# Patient Record
Sex: MALE | Race: BLACK OR AFRICAN AMERICAN | Employment: UNEMPLOYED
[De-identification: names, ages, dates, MRNs, and addresses within clinical notes are randomized per-mention and may not be internally consistent; named-entity substitution may affect disease eponyms.]

---
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
## 2021-07-13 ENCOUNTER — HOSPITAL ENCOUNTER (EMERGENCY)
Age: 21
Discharge: HOME | End: 2021-07-13
Attending: NURSE PRACTITIONER
Payer: MEDICARE

## 2021-07-13 VITALS
WEIGHT: 162 LBS | SYSTOLIC BLOOD PRESSURE: 130 MMHG | HEART RATE: 68 BPM | TEMPERATURE: 98.2 F | BODY MASS INDEX: 26.96 KG/M2 | DIASTOLIC BLOOD PRESSURE: 69 MMHG | RESPIRATION RATE: 14 BRPM | OXYGEN SATURATION: 97 %

## 2021-07-13 DIAGNOSIS — Z20.2 EXPOSURE TO STD: Primary | ICD-10-CM

## 2021-07-13 LAB
BILIRUBIN URINE: NEGATIVE
BLOOD, URINE: NEGATIVE
CHARACTER, URINE: CLEAR
COLOR: YELLOW
GLUCOSE URINE: NEGATIVE MG/DL
KETONES, URINE: NEGATIVE
LEUKOCYTE ESTERASE, URINE: NEGATIVE
NITRITE, URINE: NEGATIVE
PH, URINE: 6 (ref 5–9)
PROTEIN, URINE: NEGATIVE MG/DL
SPECIFIC GRAVITY, URINE: >= 1.03 (ref 1–1.03)
UROBILINOGEN, URINE: 0.2 EU/DL (ref 0.2–1)

## 2021-07-13 PROCEDURE — 99213 OFFICE O/P EST LOW 20 MIN: CPT

## 2021-07-13 PROCEDURE — 6370000000 HC RX 637 (ALT 250 FOR IP): Performed by: NURSE PRACTITIONER

## 2021-07-13 PROCEDURE — 81003 URINALYSIS AUTO W/O SCOPE: CPT

## 2021-07-13 PROCEDURE — 2500000003 HC RX 250 WO HCPCS: Performed by: NURSE PRACTITIONER

## 2021-07-13 PROCEDURE — 99213 OFFICE O/P EST LOW 20 MIN: CPT | Performed by: NURSE PRACTITIONER

## 2021-07-13 PROCEDURE — 87491 CHLMYD TRACH DNA AMP PROBE: CPT

## 2021-07-13 PROCEDURE — 96372 THER/PROPH/DIAG INJ SC/IM: CPT

## 2021-07-13 PROCEDURE — 87591 N.GONORRHOEAE DNA AMP PROB: CPT

## 2021-07-13 PROCEDURE — 6360000002 HC RX W HCPCS: Performed by: NURSE PRACTITIONER

## 2021-07-13 RX ORDER — AZITHROMYCIN 250 MG/1
1000 TABLET, FILM COATED ORAL ONCE
Status: COMPLETED | OUTPATIENT
Start: 2021-07-13 | End: 2021-07-13

## 2021-07-13 RX ADMIN — LIDOCAINE HYDROCHLORIDE 500 MG: 10 INJECTION, SOLUTION EPIDURAL; INFILTRATION; INTRACAUDAL; PERINEURAL at 08:39

## 2021-07-13 RX ADMIN — AZITHROMYCIN MONOHYDRATE 1000 MG: 250 TABLET ORAL at 08:38

## 2021-07-13 ASSESSMENT — ENCOUNTER SYMPTOMS
COUGH: 0
SORE THROAT: 0
CHEST TIGHTNESS: 0
DIARRHEA: 0
SHORTNESS OF BREATH: 0
RHINORRHEA: 0
VOMITING: 0
NAUSEA: 0

## 2021-07-13 NOTE — ED TRIAGE NOTES
Pt ambulatory into esuc with c/o std, pt states for the past week he has had a tingling sensation after urination and just feels dirty. Pt denies any pain, discharge or sores.

## 2021-07-17 LAB
CHLAMYDIA TRACHOMATIS AMPLIFIED DET: NEGATIVE
NEISSERIA GONORRHOEAE BY AMP: NEGATIVE
SPECIMEN SOURCE: NORMAL

## 2022-11-21 ENCOUNTER — HOSPITAL ENCOUNTER (EMERGENCY)
Age: 22
Discharge: HOME | End: 2022-11-21
Payer: MEDICARE

## 2022-11-21 VITALS
DIASTOLIC BLOOD PRESSURE: 84 MMHG | TEMPERATURE: 98.5 F | WEIGHT: 170 LBS | SYSTOLIC BLOOD PRESSURE: 127 MMHG | OXYGEN SATURATION: 98 % | BODY MASS INDEX: 28.29 KG/M2 | HEART RATE: 82 BPM | RESPIRATION RATE: 14 BRPM

## 2022-11-21 DIAGNOSIS — Z20.2 GONORRHEA CONTACT: Primary | ICD-10-CM

## 2022-11-21 LAB
CHLAMYDIA TRACHOMATIS BY RT-PCR: DETECTED
CT/NG SOURCE: ABNORMAL
NEISSERIA GONORRHOEAE BY RT-PCR: NOT DETECTED

## 2022-11-21 PROCEDURE — 6360000002 HC RX W HCPCS: Performed by: NURSE PRACTITIONER

## 2022-11-21 PROCEDURE — 87591 N.GONORRHOEAE DNA AMP PROB: CPT

## 2022-11-21 PROCEDURE — 6370000000 HC RX 637 (ALT 250 FOR IP): Performed by: NURSE PRACTITIONER

## 2022-11-21 PROCEDURE — 87491 CHLMYD TRACH DNA AMP PROBE: CPT

## 2022-11-21 PROCEDURE — 99213 OFFICE O/P EST LOW 20 MIN: CPT | Performed by: NURSE PRACTITIONER

## 2022-11-21 PROCEDURE — 2500000003 HC RX 250 WO HCPCS: Performed by: NURSE PRACTITIONER

## 2022-11-21 PROCEDURE — 96372 THER/PROPH/DIAG INJ SC/IM: CPT

## 2022-11-21 PROCEDURE — 99213 OFFICE O/P EST LOW 20 MIN: CPT

## 2022-11-21 RX ORDER — AZITHROMYCIN 250 MG/1
1000 TABLET, FILM COATED ORAL ONCE
Status: COMPLETED | OUTPATIENT
Start: 2022-11-21 | End: 2022-11-21

## 2022-11-21 RX ADMIN — AZITHROMYCIN MONOHYDRATE 1000 MG: 250 TABLET ORAL at 12:49

## 2022-11-21 RX ADMIN — LIDOCAINE HYDROCHLORIDE 500 MG: 10 INJECTION, SOLUTION EPIDURAL; INFILTRATION; INTRACAUDAL; PERINEURAL at 12:49

## 2022-11-21 ASSESSMENT — ENCOUNTER SYMPTOMS
NAUSEA: 0
SHORTNESS OF BREATH: 0
VOMITING: 0

## 2022-11-21 ASSESSMENT — PAIN - FUNCTIONAL ASSESSMENT: PAIN_FUNCTIONAL_ASSESSMENT: NONE - DENIES PAIN

## 2022-11-21 NOTE — ED TRIAGE NOTES
Jemma Florian arrives to room with complaint of std exposure states he was exposed yesterday. Partner informed pt yesterday she has gonorrhea. Pt has no symptoms.

## 2022-11-21 NOTE — ED PROVIDER NOTES
[facility]  Urgent Care Encounter       CHIEF COMPLAINT       Chief Complaint   Patient presents with    Exposure to STD       Nurses Notes reviewed and I agree except as noted in the HPI. HISTORY OF PRESENT ILLNESS   Junior Gaitan is a 25 y.o. male who presents for concerns of exposure to gonorrhea. Patient states that he has been sexually active with a female partner over the past 2 weeks who recently told him that she tested positive for gonorrhea. He denies any symptoms at this time. He states that he has had 5 sexual partners over the past 2 to 3 months but denies any partners since the female partner who exposed him to gonorrhea. States that he would like to be tested and treated today. The history is provided by the patient. REVIEW OF SYSTEMS     Review of Systems   Constitutional:  Negative for chills and fever. Respiratory:  Negative for shortness of breath. Cardiovascular:  Negative for chest pain. Gastrointestinal:  Negative for nausea and vomiting. Genitourinary:  Negative for dysuria, penile discharge, scrotal swelling and testicular pain. Musculoskeletal:  Negative for arthralgias and myalgias. Skin:  Negative for rash. Neurological:  Negative for headaches. PAST MEDICAL HISTORY         Diagnosis Date    ADHD (attention deficit hyperactivity disorder)        SURGICALHISTORY     Patient  has a past surgical history that includes Mandible fracture surgery (09/14/2017); Mandible fracture surgery (N/A, 9/14/2017); Hardware Removal (11/02/2017); and pr removal superficial implant (N/A, 11/2/2017). CURRENT MEDICATIONS       Previous Medications    No medications on file       ALLERGIES     Patient is has No Known Allergies. Patients   There is no immunization history on file for this patient. FAMILY HISTORY     Patient's family history is not on file. SOCIAL HISTORY     Patient  reports that he has been smoking cigarettes.  He has a 5.00 pack-year smoking history. He has never used smokeless tobacco. He reports current alcohol use. He reports current drug use. Drug: Marijuana Ehsan Rothman). PHYSICAL EXAM     ED TRIAGE VITALS  BP: 127/84, Temp: 98.5 °F (36.9 °C), Heart Rate: 82, Resp: 14, SpO2: 98 %,Estimated body mass index is 28.29 kg/m² as calculated from the following:    Height as of 11/11/19: 5' 5\" (1.651 m). Weight as of this encounter: 170 lb (77.1 kg). ,No LMP for male patient. Physical Exam  Vitals and nursing note reviewed. Constitutional:       General: He is not in acute distress. Appearance: He is well-developed. He is not diaphoretic. Eyes:      Conjunctiva/sclera:      Right eye: Right conjunctiva is not injected. Left eye: Left conjunctiva is not injected. Pupils: Pupils are equal.   Cardiovascular:      Rate and Rhythm: Normal rate and regular rhythm. Heart sounds: No murmur heard. Pulmonary:      Effort: Pulmonary effort is normal. No respiratory distress. Breath sounds: Normal breath sounds. Abdominal:      General: Bowel sounds are normal.      Palpations: Abdomen is soft. Tenderness: There is no abdominal tenderness. Genitourinary:     Comments: Exam deferred  Musculoskeletal:      Cervical back: Normal range of motion. Skin:     General: Skin is warm. Findings: No rash. Neurological:      Mental Status: He is alert and oriented to person, place, and time. Psychiatric:         Behavior: Behavior normal.       DIAGNOSTIC RESULTS     Labs:No results found for this visit on 11/21/22.     IMAGING:    No orders to display         EKG:      URGENT CARE COURSE:     Vitals:    11/21/22 1224   BP: 127/84   Pulse: 82   Resp: 14   Temp: 98.5 °F (36.9 °C)   TempSrc: Oral   SpO2: 98%   Weight: 170 lb (77.1 kg)       Medications   cefTRIAXone (ROCEPHIN) 500 mg in lidocaine 1 % 1.4286 mL IM Injection (has no administration in time range)   azithromycin (ZITHROMAX) tablet 1,000 mg (has no administration in time range)            PROCEDURES:  None    FINAL IMPRESSION      1. Gonorrhea contact          DISPOSITION/ PLAN     Patient will be treated for gonorrhea based on his known exposure and he is advised remain free of sexual activity for 2 weeks. He is instructed to rest and hydrate and follow-up on an outpatient basis as needed for any further issues or concerns. He is agreeable to plan as discussed. PATIENT REFERRED TO:  Rachell Rocha, DESI - CNP  [address] [facility] / [place] [id number]      DISCHARGE MEDICATIONS:  New Prescriptions    No medications on file       Discontinued Medications    No medications on file       There are no discharge medications for this patient.       DESI Sepulveda - CNP    (Please note that portions of this note were completed with a voice recognition program. Efforts were made to edit the dictations but occasionally words are mis-transcribed.)          DESI Sepulveda CNP  11/21/22 [id number]
